# Patient Record
Sex: FEMALE | Race: BLACK OR AFRICAN AMERICAN | NOT HISPANIC OR LATINO | Employment: FULL TIME | URBAN - METROPOLITAN AREA
[De-identification: names, ages, dates, MRNs, and addresses within clinical notes are randomized per-mention and may not be internally consistent; named-entity substitution may affect disease eponyms.]

---

## 2022-08-16 ENCOUNTER — TELEPHONE (OUTPATIENT)
Dept: ONCOLOGY | Facility: CLINIC | Age: 32
End: 2022-08-16

## 2022-08-16 NOTE — TELEPHONE ENCOUNTER
[facility]UB     Left voicemail for patient to call back and give HUB information that is missing. Please send me an in basket when you have entered in her info. Thank you

## 2022-08-19 DIAGNOSIS — D75.839 THROMBOCYTOSIS: Primary | ICD-10-CM

## 2022-08-22 ENCOUNTER — CONSULT (OUTPATIENT)
Dept: ONCOLOGY | Facility: CLINIC | Age: 32
End: 2022-08-22

## 2022-08-22 ENCOUNTER — LAB (OUTPATIENT)
Dept: LAB | Facility: HOSPITAL | Age: 32
End: 2022-08-22

## 2022-08-22 VITALS
RESPIRATION RATE: 18 BRPM | OXYGEN SATURATION: 100 % | TEMPERATURE: 96.9 F | HEIGHT: 65 IN | HEART RATE: 108 BPM | BODY MASS INDEX: 43.28 KG/M2 | WEIGHT: 259.8 LBS | SYSTOLIC BLOOD PRESSURE: 127 MMHG | DIASTOLIC BLOOD PRESSURE: 85 MMHG

## 2022-08-22 DIAGNOSIS — D75.839 THROMBOCYTOSIS: ICD-10-CM

## 2022-08-22 DIAGNOSIS — E61.1 IRON DEFICIENCY: Primary | ICD-10-CM

## 2022-08-22 LAB
ALBUMIN SERPL-MCNC: 4.2 G/DL (ref 3.5–5.2)
ALBUMIN/GLOB SERPL: 1.1 G/DL (ref 1.1–2.4)
ALP SERPL-CCNC: 65 U/L (ref 38–116)
ALT SERPL W P-5'-P-CCNC: 125 U/L (ref 0–33)
ANION GAP SERPL CALCULATED.3IONS-SCNC: 14.9 MMOL/L (ref 5–15)
AST SERPL-CCNC: 50 U/L (ref 0–32)
BASOPHILS # BLD AUTO: 0.05 10*3/MM3 (ref 0–0.2)
BASOPHILS NFR BLD AUTO: 0.7 % (ref 0–1.5)
BILIRUB SERPL-MCNC: 0.2 MG/DL (ref 0.2–1.2)
BUN SERPL-MCNC: 7 MG/DL (ref 6–20)
BUN/CREAT SERPL: 8.3 (ref 7.3–30)
CALCIUM SPEC-SCNC: 9.4 MG/DL (ref 8.5–10.2)
CHLORIDE SERPL-SCNC: 100 MMOL/L (ref 98–107)
CO2 SERPL-SCNC: 23.1 MMOL/L (ref 22–29)
CREAT SERPL-MCNC: 0.84 MG/DL (ref 0.6–1.1)
DEPRECATED RDW RBC AUTO: 39.8 FL (ref 37–54)
EGFRCR SERPLBLD CKD-EPI 2021: 95.4 ML/MIN/1.73
EOSINOPHIL # BLD AUTO: 0.07 10*3/MM3 (ref 0–0.4)
EOSINOPHIL NFR BLD AUTO: 0.9 % (ref 0.3–6.2)
ERYTHROCYTE [DISTWIDTH] IN BLOOD BY AUTOMATED COUNT: 13.3 % (ref 12.3–15.4)
FERRITIN SERPL-MCNC: 64.8 NG/ML (ref 11–207)
GLOBULIN UR ELPH-MCNC: 3.7 GM/DL (ref 1.8–3.5)
GLUCOSE SERPL-MCNC: 101 MG/DL (ref 74–124)
HCT VFR BLD AUTO: 40.3 % (ref 34–46.6)
HGB BLD-MCNC: 13.5 G/DL (ref 12–15.9)
IMM GRANULOCYTES # BLD AUTO: 0.05 10*3/MM3 (ref 0–0.05)
IMM GRANULOCYTES NFR BLD AUTO: 0.7 % (ref 0–0.5)
IRON 24H UR-MRATE: 48 MCG/DL (ref 37–145)
IRON SATN MFR SERPL: 10 % (ref 14–48)
LYMPHOCYTES # BLD AUTO: 3.42 10*3/MM3 (ref 0.7–3.1)
LYMPHOCYTES NFR BLD AUTO: 44.6 % (ref 19.6–45.3)
MCH RBC QN AUTO: 27.5 PG (ref 26.6–33)
MCHC RBC AUTO-ENTMCNC: 33.5 G/DL (ref 31.5–35.7)
MCV RBC AUTO: 82.1 FL (ref 79–97)
MONOCYTES # BLD AUTO: 0.51 10*3/MM3 (ref 0.1–0.9)
MONOCYTES NFR BLD AUTO: 6.7 % (ref 5–12)
NEUTROPHILS NFR BLD AUTO: 3.56 10*3/MM3 (ref 1.7–7)
NEUTROPHILS NFR BLD AUTO: 46.4 % (ref 42.7–76)
NRBC BLD AUTO-RTO: 0 /100 WBC (ref 0–0.2)
PLATELET # BLD AUTO: 441 10*3/MM3 (ref 140–450)
PMV BLD AUTO: 9.1 FL (ref 6–12)
POTASSIUM SERPL-SCNC: 3.5 MMOL/L (ref 3.5–4.7)
PROT SERPL-MCNC: 7.9 G/DL (ref 6.3–8)
RBC # BLD AUTO: 4.91 10*6/MM3 (ref 3.77–5.28)
SODIUM SERPL-SCNC: 138 MMOL/L (ref 134–145)
TIBC SERPL-MCNC: 463 MCG/DL (ref 249–505)
TRANSFERRIN SERPL-MCNC: 331 MG/DL (ref 200–360)
VIT B12 BLD-MCNC: 565 PG/ML (ref 211–946)
WBC NRBC COR # BLD: 7.66 10*3/MM3 (ref 3.4–10.8)

## 2022-08-22 PROCEDURE — 82728 ASSAY OF FERRITIN: CPT | Performed by: INTERNAL MEDICINE

## 2022-08-22 PROCEDURE — 85025 COMPLETE CBC W/AUTO DIFF WBC: CPT

## 2022-08-22 PROCEDURE — 80053 COMPREHEN METABOLIC PANEL: CPT | Performed by: INTERNAL MEDICINE

## 2022-08-22 PROCEDURE — 99204 OFFICE O/P NEW MOD 45 MIN: CPT | Performed by: INTERNAL MEDICINE

## 2022-08-22 PROCEDURE — 36415 COLL VENOUS BLD VENIPUNCTURE: CPT

## 2022-08-22 PROCEDURE — 83540 ASSAY OF IRON: CPT | Performed by: INTERNAL MEDICINE

## 2022-08-22 PROCEDURE — 82607 VITAMIN B-12: CPT | Performed by: INTERNAL MEDICINE

## 2022-08-22 PROCEDURE — 84466 ASSAY OF TRANSFERRIN: CPT | Performed by: INTERNAL MEDICINE

## 2022-08-22 RX ORDER — PROMETHAZINE HYDROCHLORIDE 25 MG/1
TABLET ORAL
COMMUNITY
Start: 2022-08-05

## 2022-08-22 RX ORDER — NORETHINDRONE AND ETHINYL ESTRADIOL 7 DAYS X 3
1 KIT ORAL DAILY
COMMUNITY
Start: 2022-06-16 | End: 2022-11-21

## 2022-08-22 RX ORDER — CYCLOBENZAPRINE HCL 10 MG
TABLET ORAL
COMMUNITY
Start: 2022-07-27

## 2022-08-22 RX ORDER — MONTELUKAST SODIUM 10 MG/1
10 TABLET ORAL DAILY
COMMUNITY
Start: 2022-08-16

## 2022-08-22 RX ORDER — PHENTERMINE HYDROCHLORIDE 37.5 MG/1
TABLET ORAL
COMMUNITY
Start: 2022-08-19

## 2022-08-22 RX ORDER — METRONIDAZOLE 10 MG/G
GEL TOPICAL
COMMUNITY
Start: 2022-07-27

## 2022-08-22 RX ORDER — LORATADINE 10 MG/1
10 TABLET ORAL DAILY
COMMUNITY
Start: 2022-07-19

## 2022-08-22 RX ORDER — HYDROCHLOROTHIAZIDE 12.5 MG/1
TABLET ORAL
COMMUNITY
Start: 2022-08-16

## 2022-08-22 NOTE — PROGRESS NOTES
.     REASON FOR CONSULTATION:     Provide an opinion on any further workup or treatment of thrombocytosis.                             REQUESTING PHYSICIAN: LARA Mckenna     RECORDS OBTAINED:  Records of the patient's history including those obtained from the referring provider were reviewed and summarized in detail.    HISTORY OF PRESENT ILLNESS:  The patient is a 31 y.o. year old female with seasonal allergy, hypertension and intermittent mild thrombocytosis who presented today for initial evaluation because of thrombocytosis.     Her most recent study from outside lab reported elevation of platelets 590,000 on 08/09/2022, in the meantime she has normal hemoglobin 13.4, WBC 6800. She also tested negative for hepatitis A IgM, hepatitis B surface antigen, hepatitis B core antibody IgM, and also negative for hepatitis C antibody. Her chemistry lab reported elevated , AST 71, and total bilirubin less than 0.2, normal alkaline phosphatase 69 and total serum protein 6.9, albumin 3.8.     The patient reports she was recently ill about a week ago, had nausea and vomiting but otherwise no significant medical problem. She reports no fever, sweating or chills. She already recovered to baseline condition.  She does not drink alcohol.    I reviewed her other laboratory study results on 07/20/2022, she had normal folate 12.7 ng/mL, and there was no vitamin B12 level. Chemistry lab reported normal renal function creatinine 0.72, normal electrolytes, total protein 7.7, albumin 4.2, globulin 3.5, and mildly elevated AST 61, , but normal total bilirubin and alkaline phosphatase.     I reviewed previous lab results.  The oldest lab results available were from 10/10/2017 which reported elevated platelets 459,000 with reference range 150,000-379,000. She had total WBC 4000 including neutrophils 1700, lymphocytes 1900 monocytes 300, and hemoglobin 13.2, MCV 84.0. The next results available were from  10/12/2018. She had platelets 413,000 (with reference 150,000-379,000), WBC 5300, including neutrophils 1700, lymphocytes 3000, and hemoglobin 13.6, with MCV 86, MCHC 32.9. No chemistry lab.     The next lab results were from 11/01/2019 and she had normal TSH 2.27, platelets 453,000 with reference range 150,000-450,000, normal hemoglobin 13.3, MCV 85, MCHC 33.5. WBC was 4400, including neutrophils 2100, lymphocytes 1900. Chemistry lab reported unremarkable CMP with normal renal function, liver function panel, electrolytes, total protein 7.0, albumin 4.2, globulin 2.8.     The next lab was from 10/15/2021 which reported platelets 536,000 with reference range 150,000-450,000, hemoglobin 12.6, MCV 82, MCHC 33.0, and WBC 6300, including neutrophils 2900, lymphocytes 3000 and monocytes 400.     The next set of lab results were from 12/07/2021 with platelets 493,000, reference range 150,000-450,000, hemoglobin 13.0, MCV 83.0, MCHC 32.7 and WBC 5900 including neutrophils 3000, lymphocytes 2400.     Labs from 01/21/2022 reported normal homocysteine level 8.9, normal methylmalonic acid 226 nmol/L, normal CMP including creatinine 0.57, normal electrolytes, glucose and liver function panel except marginally elevated ALT 38 otherwise unremarkable liver panel. She had iron saturation 15%, free iron 57, TIBC 386 and vitamin B12 level 234 pg/mL, C-reactive protein 27 mg/L without ferritin level.     Then study on 07/08/2022 reported ferritin 62 ng/mL but without iron profile, normal TSH 3.12, platelets 306,000 with normal range 150,000-450,000, and WBC 6600, neutrophils 3200, lymphocytes 3000, and hemoglobin 13.8, MCV 83.0, MCHC 32.5. Chemistry lab reported unremarkable CMP except mildly elevated AST 49, and worsening ALT 74 but normal alkaline phosphatase and total bilirubin.       Laboratory study today in our clinic on 08/22/2022 reported improved platelets 441,000 with reference range 150,000-450,000, normal WBC 7660 including  neutrophils 3560, lymphocytes 3420, monocytes 510 and hemoglobin 13.5 with MCV 82.1, MCHC 33.5.     I reviewed her peripheral blood smear today, there is mild anisocytosis, however no target cells, no schistocytes.    The patient reports she had heavy menses since 10/2021. Prior to that she had regular menstrual volume.     The patient reports she is not on oral iron therapy.    The patient does report chronic fatigue but denies fainting, syncope. She denies melena or hematochezia. She has some night sweats, no cold sensitivity or heat intolerance. She has soreness on her tongue surface but otherwise denies mucositis. She has lower Gi symptoms with poor appetite, intermittent abdominal pain, nausea, constipation and diarrhea intermittently, however she denies melena or hematochezia or fresh red blood per rectum. She also has chronic back pain and arthralgia with some numbness in extremities. She also has intermittent headaches and dizziness but no fainting. She also reports easy bruising but no spontaneous bleeding such as epistaxis or gum bleeding.         Past Medical History:   Diagnosis Date    History of thrombocytosis     Mild     No past surgical history on file.    MEDICATIONS    Current Outpatient Medications:     ciclopirox (PENLAC) 8 % solution, APPLY EXTERNALLY TO APPROPRIATE AREA(S) ONCE A DAY FOR 30 DAYS, Disp: , Rfl:     cyclobenzaprine (FLEXERIL) 10 MG tablet, TAKE 1 TABLET BY MOUTH THREE TIMES A DAY AS NEEDED FOR 10 DAYS, Disp: , Rfl:     Dasetta 7/7/7 0.5/0.75/1-35 MG-MCG per tablet, Take 1 tablet by mouth Daily., Disp: , Rfl:     hydroCHLOROthiazide (HYDRODIURIL) 12.5 MG tablet, TAKE 1 OR 2 TABLETS BY MOUTH IN THE MORNING, Disp: , Rfl:     loratadine (CLARITIN) 10 MG tablet, Take 10 mg by mouth Daily., Disp: , Rfl:     metroNIDAZOLE (METROGEL) 1 % gel, APPLY TOPICALLY TO AFFECTED AREA(S) TWO TIMES DAILY FOR 30 DAYS, Disp: , Rfl:     montelukast (SINGULAIR) 10 MG tablet, Take 10 mg by mouth Daily.,  Disp: , Rfl:     phentermine (ADIPEX-P) 37.5 MG tablet, , Disp: , Rfl:     promethazine (PHENERGAN) 25 MG tablet, TAKE 1 TABLET BY MOUTH EVERY EIGHT HOURS AS NEEDED FOR NAUSEA FOR 5 DAYS, Disp: , Rfl:     ALLERGIES:     Allergies   Allergen Reactions    Clindamycin/Lincomycin Rash    Thimerosal Rash and Swelling    Bacitracin Rash    Blue Dyes (Parenteral) Rash    Neomycin Rash       SOCIAL HISTORY:       Social History     Socioeconomic History    Marital status: Single   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   No history of cigarette smoking.  Very occasional social drinker.        FAMILY HISTORY:  No family history for blood disorder including sickle cell disorder.   Her mother has IBS.   Maternal great grandmother had malignancy no details, she also had diabetes.  She passed away.     .    REVIEW OF SYSTEMS:  Review of Systems   Constitutional: Positive for diaphoresis (Intermittent night sweats) and fatigue. Negative for appetite change and unexpected weight change.   HENT: Positive for mouth sores (Soreness on tongue surface, no oral mucositis). Negative for nosebleeds and sore throat.    Eyes: Negative for visual disturbance.   Respiratory: Negative for cough and shortness of breath.    Cardiovascular: Negative for chest pain and leg swelling.   Gastrointestinal: Positive for abdominal pain, constipation, diarrhea, nausea and vomiting. Negative for anal bleeding and blood in stool.   Endocrine: Negative for cold intolerance and heat intolerance.   Genitourinary: Positive for vaginal bleeding (Heavy menses since October 2021). Negative for hematuria.   Musculoskeletal: Positive for joint swelling (Ankle swelling). Negative for arthralgias.   Skin: Positive for rash. Negative for pallor.   Allergic/Immunologic: Negative for immunocompromised state.   Neurological: Positive for dizziness, light-headedness, numbness and headaches. Negative for syncope.   Hematological: Negative for  "adenopathy. Bruises/bleeds easily.   Psychiatric/Behavioral: Negative for confusion.              Vitals:    08/22/22 0913   BP: 127/85   Pulse: 108   Resp: 18   Temp: 96.9 °F (36.1 °C)   TempSrc: Temporal   SpO2: 100%   Weight: 118 kg (259 lb 12.8 oz)   Height: 165.1 cm (65\")   PainSc:   4   PainLoc: Abdomen     No flowsheet data found.   PHYSICAL EXAM:      CONSTITUTIONAL:  Vital signs reviewed.  Well-developed -American female BMI 43.2.  No distress, looks comfortable.  EYES:  Conjunctivae and lids unremarkable.  PERRLA  EARS,NOSE,MOUTH,THROAT:  Ears and nose appear unremarkable.  Lips, teeth, gums appear unremarkable.  RESPIRATORY:  Normal respiratory effort.  Lungs clear to auscultation bilaterally.  CARDIOVASCULAR:  Normal S1, S2.  No murmurs rubs or gallops.  No significant lower extremity edema.  GASTROINTESTINAL: Abdomen Nontender.  No hepatomegaly.  No splenomegaly.  Bowel sounds normal.  LYMPHATIC:  No cervical, supraclavicular, axillary lymphadenopathy.  MUSCULOSKELETAL:  Unremarkable gait and station.  Unremarkable digits/nails.  No cyanosis or clubbing.  SKIN:  Warm.  No rashes.  PSYCHIATRIC:  Normal judgment and insight.  Normal mood and affect.      RECENT LABS:        WBC   Date Value Ref Range Status   08/22/2022 7.66 3.40 - 10.80 10*3/mm3 Final     Hemoglobin   Date Value Ref Range Status   08/22/2022 13.5 12.0 - 15.9 g/dL Final     Platelets   Date Value Ref Range Status   08/22/2022 441 140 - 450 10*3/mm3 Final     Comment:     New pt fingerstick tube-some plt clumping       Assessment & Plan       ASSESSMENT:  1. Mild thrombocytopenia, intermittent since 2017. She only had 1 time platelets 603,000 but the rest all below 600,000 and sometimes in the lower 400,000 range. I reviewed her peripheral blood smear today, no evidence for hematological malignancy. I think her thrombocytosis is most likely reactive to some inflammatory condition such as recently she had worsening GI symptoms sounds " like viral infection. She had elevation of platelets.  However today platelets are within normal territory at 441,000.  Over the past several years, there was no persistent worsening thrombocytosis, instead it was fluctuation over a time.  This does not fit with myeloproliferative disorder.  I do not think we need to do JAK2 mutation at this point.    2. Evidence of iron deficiency with low iron saturation in 01/2022. The patient also reports she started having heavy menses since 10/2021. Laboratory study today reported normal hemoglobin, however she has microcytosis and morphology of peripheral blood smear also suspicious for some iron deficiency. I recommended to obtain iron study. I pointed out that iron deficiency itself could also sometimes cause elevation of platelets.     3.  The patient has elevated liver function panel. This is not clear for the etiology. She reports had recent ultrasound of the liver and was reported normal. I do not have that report available. The patient will continue follow up with her primary care provider.        PLAN:  1. Laboratory study today.  - Ferritin  - Iron Profile  - Vitamin B12  - Comprehensive Metabolic Panel    2. Will call results to patient.  3. I will bring the patient back for reevaluation in 3 months, will repeat CBC, ferritin, iron profile and also CMP.        ADDENDUM:     Lab Results   Component Value Date    IRON 48 08/22/2022    TIBC 463 08/22/2022    FERRITIN 64.80 08/22/2022   Iron saturation 10%.    Lab Results   Component Value Date    [id number] 565 08/22/2022     Lab Results   Component Value Date    GLUCOSE 101 08/22/2022    BUN 7 08/22/2022    CREATININE 0.84 08/22/2022    BCR 8.3 08/22/2022    K 3.5 08/22/2022    CO2 23.1 08/22/2022    CALCIUM 9.4 08/22/2022    ALBUMIN 4.20 08/22/2022    AST 50 (H) 08/22/2022     (C) 08/22/2022         Laboratory study today reported improved  and AST 50, normal total bilirubin and alkaline phosphatase.  She also has worsening iron saturation 10% with free iron 48, TIBC 463, and mediocre ferritin 64.8 ng/mL, normal but mediocre B12 level at 565 pg/mL.     I think this patient would benefit from oral iron treatment with ferrous sulfate 325 mg twice a day. We will prescribe to the patient and inform her.        BOBBY MOORE M.D., Ph.D.     8/22/2022          CC: LARA Mckenna

## 2022-09-01 ENCOUNTER — TELEPHONE (OUTPATIENT)
Dept: ONCOLOGY | Facility: CLINIC | Age: 32
End: 2022-09-01

## 2022-09-01 RX ORDER — FERROUS SULFATE 325(65) MG
325 TABLET ORAL 2 TIMES DAILY WITH MEALS
Qty: 60 TABLET | Refills: 3 | Status: SHIPPED | OUTPATIENT
Start: 2022-09-01

## 2022-09-01 NOTE — TELEPHONE ENCOUNTER
Caller: KENZIE    Relationship to patient: SELF    Best call back number: [phone]    Patient is needing: TO KNOW WHAT IS GOING ON WITH HER TREATMENT PLAN. SHE SAID SHE HAS NOT HEARD ANYTHING.

## 2022-09-01 NOTE — TELEPHONE ENCOUNTER
Returned call to patient after reviewing Dr. Pantoja's note from 8/22/2022, Patient is to take Ferrous Sulfate 325 mg bid, which will be sent to her pharmacy.  He will see her back in 3 months and lab test will be repeated at that time.  She v/u.

## 2022-11-21 ENCOUNTER — OFFICE VISIT (OUTPATIENT)
Dept: ONCOLOGY | Facility: CLINIC | Age: 32
End: 2022-11-21

## 2022-11-21 ENCOUNTER — LAB (OUTPATIENT)
Dept: LAB | Facility: HOSPITAL | Age: 32
End: 2022-11-21

## 2022-11-21 VITALS
HEART RATE: 114 BPM | SYSTOLIC BLOOD PRESSURE: 125 MMHG | BODY MASS INDEX: 42.85 KG/M2 | RESPIRATION RATE: 18 BRPM | DIASTOLIC BLOOD PRESSURE: 83 MMHG | HEIGHT: 65 IN | WEIGHT: 257.2 LBS | TEMPERATURE: 98 F

## 2022-11-21 DIAGNOSIS — D75.839 THROMBOCYTOSIS: ICD-10-CM

## 2022-11-21 DIAGNOSIS — E61.1 IRON DEFICIENCY: ICD-10-CM

## 2022-11-21 DIAGNOSIS — E61.1 IRON DEFICIENCY: Primary | ICD-10-CM

## 2022-11-21 LAB
ALBUMIN SERPL-MCNC: 4.1 G/DL (ref 3.5–5.2)
ALBUMIN/GLOB SERPL: 1.1 G/DL (ref 1.1–2.4)
ALP SERPL-CCNC: 61 U/L (ref 38–116)
ALT SERPL W P-5'-P-CCNC: 14 U/L (ref 0–33)
ANION GAP SERPL CALCULATED.3IONS-SCNC: 13 MMOL/L (ref 5–15)
AST SERPL-CCNC: 18 U/L (ref 0–32)
BASOPHILS # BLD AUTO: 0.05 10*3/MM3 (ref 0–0.2)
BASOPHILS NFR BLD AUTO: 0.8 % (ref 0–1.5)
BILIRUB SERPL-MCNC: 0.3 MG/DL (ref 0.2–1.2)
BUN SERPL-MCNC: 9 MG/DL (ref 6–20)
BUN/CREAT SERPL: 12.7 (ref 7.3–30)
CALCIUM SPEC-SCNC: 9.6 MG/DL (ref 8.5–10.2)
CHLORIDE SERPL-SCNC: 98 MMOL/L (ref 98–107)
CO2 SERPL-SCNC: 27 MMOL/L (ref 22–29)
CREAT SERPL-MCNC: 0.71 MG/DL (ref 0.6–1.1)
DEPRECATED RDW RBC AUTO: 42.8 FL (ref 37–54)
EGFRCR SERPLBLD CKD-EPI 2021: 116.7 ML/MIN/1.73
EOSINOPHIL # BLD AUTO: 0.04 10*3/MM3 (ref 0–0.4)
EOSINOPHIL NFR BLD AUTO: 0.6 % (ref 0.3–6.2)
ERYTHROCYTE [DISTWIDTH] IN BLOOD BY AUTOMATED COUNT: 13.6 % (ref 12.3–15.4)
FERRITIN SERPL-MCNC: 91.3 NG/ML (ref 11–207)
GLOBULIN UR ELPH-MCNC: 3.9 GM/DL (ref 1.8–3.5)
GLUCOSE SERPL-MCNC: 105 MG/DL (ref 74–124)
HCT VFR BLD AUTO: 43.2 % (ref 34–46.6)
HGB BLD-MCNC: 13.7 G/DL (ref 12–15.9)
IMM GRANULOCYTES # BLD AUTO: 0.01 10*3/MM3 (ref 0–0.05)
IMM GRANULOCYTES NFR BLD AUTO: 0.2 % (ref 0–0.5)
IRON 24H UR-MRATE: 53 MCG/DL (ref 37–145)
IRON SATN MFR SERPL: 14 % (ref 14–48)
LYMPHOCYTES # BLD AUTO: 3.43 10*3/MM3 (ref 0.7–3.1)
LYMPHOCYTES NFR BLD AUTO: 52.4 % (ref 19.6–45.3)
MCH RBC QN AUTO: 27.3 PG (ref 26.6–33)
MCHC RBC AUTO-ENTMCNC: 31.7 G/DL (ref 31.5–35.7)
MCV RBC AUTO: 86.1 FL (ref 79–97)
MONOCYTES # BLD AUTO: 0.41 10*3/MM3 (ref 0.1–0.9)
MONOCYTES NFR BLD AUTO: 6.3 % (ref 5–12)
NEUTROPHILS NFR BLD AUTO: 2.6 10*3/MM3 (ref 1.7–7)
NEUTROPHILS NFR BLD AUTO: 39.7 % (ref 42.7–76)
NRBC BLD AUTO-RTO: 0 /100 WBC (ref 0–0.2)
PLATELET # BLD AUTO: 472 10*3/MM3 (ref 140–450)
PMV BLD AUTO: 7.9 FL (ref 6–12)
POTASSIUM SERPL-SCNC: 3.4 MMOL/L (ref 3.5–4.7)
PROT SERPL-MCNC: 8 G/DL (ref 6.3–8)
RBC # BLD AUTO: 5.02 10*6/MM3 (ref 3.77–5.28)
SODIUM SERPL-SCNC: 138 MMOL/L (ref 134–145)
TIBC SERPL-MCNC: 389 MCG/DL (ref 249–505)
TRANSFERRIN SERPL-MCNC: 278 MG/DL (ref 200–360)
WBC NRBC COR # BLD: 6.54 10*3/MM3 (ref 3.4–10.8)

## 2022-11-21 PROCEDURE — 82728 ASSAY OF FERRITIN: CPT

## 2022-11-21 PROCEDURE — 83540 ASSAY OF IRON: CPT

## 2022-11-21 PROCEDURE — 80053 COMPREHEN METABOLIC PANEL: CPT

## 2022-11-21 PROCEDURE — 36415 COLL VENOUS BLD VENIPUNCTURE: CPT

## 2022-11-21 PROCEDURE — 84466 ASSAY OF TRANSFERRIN: CPT

## 2022-11-21 PROCEDURE — 85025 COMPLETE CBC W/AUTO DIFF WBC: CPT

## 2022-11-21 PROCEDURE — 99213 OFFICE O/P EST LOW 20 MIN: CPT | Performed by: INTERNAL MEDICINE

## 2022-11-21 RX ORDER — FEXOFENADINE HCL 180 MG/1
TABLET ORAL
COMMUNITY
Start: 2022-11-13

## 2022-11-21 RX ORDER — ACETAMINOPHEN 500 MG
2000 TABLET ORAL DAILY
COMMUNITY
Start: 2022-10-31

## 2022-11-21 RX ORDER — MISOPROSTOL 200 UG/1
TABLET ORAL
COMMUNITY
Start: 2022-10-07

## 2022-11-21 NOTE — PROGRESS NOTES
.     REASON FOR FOLLOWUP:     1. Mild thrombocytosis  2. Iron-deficiency anemia                                 HISTORY OF PRESENT ILLNESS:  The patient is a 31 y.o. year old female with seasonal allergy, hypertension and intermittent mild thrombocytosis who presented today on 11/21/2022 for 3-month reevaluation.     Patient admits to taking her ferrous sulfate twice a day. She complains of having on and off rashes since she was last seen. Patient showed me multiple photos of sporadic rashes on her face, chest, hands, and lower extremity. The rashes on face and the hands looks like wheels but sometimes the rashes improve on their own. She is waiting to see an allergist but she has not scheduled an appointment yet.    She is unsure if these are side effects to the ferrous sulfate. She reports having no known allergies to any other medications. Patient had an IUD placed in the past since she was last seen. She reports having lesser volume of blood with her menstruation.    Laboratory study today on 11/21/2022 reported mild thrombocytosis platelets 472,000, normal hemoglobin 13.7, improved MCV 86.1, and WBC 6540 including neutrophils 2600, lymphocytes 3430.  Iron study reported improved ferritin 91.3 ng/mL, and iron saturation 14% with a free iron 53 TIBC 389.  Chemistry lab reported normalized liver function panel and unremarkable CMP.      Past Medical History:   Diagnosis Date   • Asthma    • History of thrombocytosis     Mild   • Tattoos      No past surgical history on file.    HEMATOLOGY HISTORY:  The patient is a 31 y.o. year old female with seasonal allergy, hypertension and intermittent mild thrombocytosis who presented on 8/22/2022 for initial evaluation because of thrombocytosis.     Her most recent study from outside lab reported elevation of platelets 590,000 on 08/09/2022, in the meantime she has normal hemoglobin 13.4, WBC 6800. She also tested negative for hepatitis A IgM, hepatitis B surface  antigen, hepatitis B core antibody IgM, and also negative for hepatitis C antibody. Her chemistry lab reported elevated , AST 71, and total bilirubin less than 0.2, normal alkaline phosphatase 69 and total serum protein 6.9, albumin 3.8.     The patient reports she was recently ill about a week ago, had nausea and vomiting but otherwise no significant medical problem. She reports no fever, sweating or chills. She already recovered to baseline condition.  She does not drink alcohol.    I reviewed her other laboratory study results on 07/20/2022, she had normal folate 12.7 ng/mL, and there was no vitamin B12 level. Chemistry lab reported normal renal function creatinine 0.72, normal electrolytes, total protein 7.7, albumin 4.2, globulin 3.5, and mildly elevated AST 61, , but normal total bilirubin and alkaline phosphatase.     I reviewed previous lab results.  The oldest lab results available were from 10/10/2017 which reported elevated platelets 459,000 with reference range 150,000-379,000. She had total WBC 4000 including neutrophils 1700, lymphocytes 1900 monocytes 300, and hemoglobin 13.2, MCV 84.0. The next results available were from 10/12/2018. She had platelets 413,000 (with reference 150,000-379,000), WBC 5300, including neutrophils 1700, lymphocytes 3000, and hemoglobin 13.6, with MCV 86, MCHC 32.9. No chemistry lab.     The next lab results were from 11/01/2019 and she had normal TSH 2.27, platelets 453,000 with reference range 150,000-450,000, normal hemoglobin 13.3, MCV 85, MCHC 33.5. WBC was 4400, including neutrophils 2100, lymphocytes 1900. Chemistry lab reported unremarkable CMP with normal renal function, liver function panel, electrolytes, total protein 7.0, albumin 4.2, globulin 2.8.     The next lab was from 10/15/2021 which reported platelets 536,000 with reference range 150,000-450,000, hemoglobin 12.6, MCV 82, MCHC 33.0, and WBC 6300, including neutrophils 2900, lymphocytes 3000  and monocytes 400.     The next set of lab results were from 12/07/2021 with platelets 493,000, reference range 150,000-450,000, hemoglobin 13.0, MCV 83.0, MCHC 32.7 and WBC 5900 including neutrophils 3000, lymphocytes 2400.     Labs from 01/21/2022 reported normal homocysteine level 8.9, normal methylmalonic acid 226 nmol/L, normal CMP including creatinine 0.57, normal electrolytes, glucose and liver function panel except marginally elevated ALT 38 otherwise unremarkable liver panel. She had iron saturation 15%, free iron 57, TIBC 386 and vitamin B12 level 234 pg/mL, C-reactive protein 27 mg/L without ferritin level.     Then study on 07/08/2022 reported ferritin 62 ng/mL but without iron profile, normal TSH 3.12, platelets 306,000 with normal range 150,000-450,000, and WBC 6600, neutrophils 3200, lymphocytes 3000, and hemoglobin 13.8, MCV 83.0, MCHC 32.5. Chemistry lab reported unremarkable CMP except mildly elevated AST 49, and worsening ALT 74 but normal alkaline phosphatase and total bilirubin.     The patient reports she had heavy menses since 10/2021. Prior to that she had regular menstrual volume.     The patient reports she is not on oral iron therapy.    The patient does report chronic fatigue but denies fainting, syncope. She denies melena or hematochezia. She has some night sweats, no cold sensitivity or heat intolerance. She has soreness on her tongue surface but otherwise denies mucositis. She has lower Gi symptoms with poor appetite, intermittent abdominal pain, nausea, constipation and diarrhea intermittently, however she denies melena or hematochezia or fresh red blood per rectum. She also has chronic back pain and arthralgia with some numbness in extremities. She also has intermittent headaches and dizziness but no fainting. She also reports easy bruising but no spontaneous bleeding such as epistaxis or gum bleeding.     Laboratory study today in our clinic on 08/22/2022 reported improved platelets  441,000 with reference range 150,000-450,000, normal WBC 7660 including neutrophils 3560, lymphocytes 3420, monocytes 510 and hemoglobin 13.5 with MCV 82.1, MCHC 33.5.     I reviewed her peripheral blood smear today, there is mild anisocytosis, however no target cells, no schistocytes.    Laboratory study 8/22/2022 reported improved  and AST 50, normal total bilirubin and alkaline phosphatase. She also has worsening iron saturation 10% with free iron 48, TIBC 463, and mediocre ferritin 64.8 ng/mL, normal but mediocre B12 level at 565 pg/mL.     I think this patient would benefit from oral iron treatment with ferrous sulfate 325 mg twice a day. We will prescribe to the patient and inform her.    Laboratory results from 11/21/2022 reports hemoglobin 13.7 g/dL, WBC 6,540/mm3, platelets 472,000/mm3. Today, her iron is 53 mcg/dL, ferritin 91.30 ng/mL, iron saturation 14%, and TIBC 389 mcg/dL. Iron profile and ferritin results were pending during the visit.    Laboratory study in our clinic on 08/22/2022 reported improved platelets 441,000 with reference range 150,000-450,000, normal WBC 7660 including neutrophils 3560, lymphocytes 3420, monocytes 510 and hemoglobin 13.5 with MCV 82.1, MCHC 33.5.     Laboratory study on 8/22/2022 reported improved  and AST 50, normal total bilirubin and alkaline phosphatase. She also has worsening iron saturation 10% with free iron 48, TIBC 463, and mediocre ferritin 64.8 ng/mL, normal but mediocre B12 level at 565 pg/mL.     I think this patient would benefit from oral iron treatment with ferrous sulfate 325 mg twice a day. We will prescribe to the patient and inform her.      MEDICATIONS    Current Outpatient Medications:   •  ciclopirox (PENLAC) 8 % solution, APPLY EXTERNALLY TO APPROPRIATE AREA(S) ONCE A DAY FOR 30 DAYS, Disp: , Rfl:   •  cyclobenzaprine (FLEXERIL) 10 MG tablet, TAKE 1 TABLET BY MOUTH THREE TIMES A DAY AS NEEDED FOR 10 DAYS, Disp: , Rfl:   •  ferrous  sulfate 325 (65 FE) MG tablet, Take 1 tablet by mouth 2 (Two) Times a Day With Meals., Disp: 60 tablet, Rfl: 3  •  fexofenadine (ALLEGRA) 180 MG tablet, , Disp: , Rfl:   •  hydroCHLOROthiazide (HYDRODIURIL) 12.5 MG tablet, TAKE 1 OR 2 TABLETS BY MOUTH IN THE MORNING, Disp: , Rfl:   •  metroNIDAZOLE (METROGEL) 1 % gel, APPLY TOPICALLY TO AFFECTED AREA(S) TWO TIMES DAILY FOR 30 DAYS, Disp: , Rfl:   •  montelukast (SINGULAIR) 10 MG tablet, Take 10 mg by mouth Daily., Disp: , Rfl:   •  phentermine (ADIPEX-P) 37.5 MG tablet, , Disp: , Rfl:   •  SM Vitamin D3 50 MCG capsule, Take 2,000 Units by mouth Daily., Disp: , Rfl:   •  loratadine (CLARITIN) 10 MG tablet, Take 10 mg by mouth Daily., Disp: , Rfl:   •  miSOPROStol (CYTOTEC) 200 MCG tablet, TAKE 1 TABLET BY MOUTH TWO TIMES A DAY. 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   · No history of cigarette smoking.  Very occasional social drinker.        FAMILY HISTORY:  · No family history for blood disorder including sickle cell disorder.   · Her mother has IBS.   · Maternal great grandmother had malignancy no details, she also had diabetes.  She passed away.     .    REVIEW OF SYSTEMS:  Review of Systems   Constitutional: Positive for diaphoresis (Intermittent night sweats) and fatigue (Somewhat improved fatigue after starting iron). Negative for appetite change and unexpected weight change.   HENT: Positive for mouth sores  "(Soreness on tongue surface, no oral mucositis). Negative for nosebleeds and sore throat.    Eyes: Negative for visual disturbance.   Respiratory: Negative for cough and shortness of breath.    Cardiovascular: Negative for chest pain and leg swelling.   Gastrointestinal: Positive for abdominal pain, constipation, diarrhea, nausea and vomiting. Negative for anal bleeding and blood in stool.   Endocrine: Negative for cold intolerance and heat intolerance.   Genitourinary: Positive for vaginal bleeding (Heavy menses since October 2021). Negative for hematuria.   Musculoskeletal: Positive for joint swelling (Ankle swelling). Negative for arthralgias.   Skin: Positive for rash. Negative for pallor.   Allergic/Immunologic: Negative for immunocompromised state.   Neurological: Positive for light-headedness, numbness and headaches. Negative for dizziness and syncope.   Hematological: Negative for adenopathy. Bruises/bleeds easily.   Psychiatric/Behavioral: Negative for confusion.     11/21/2022, no ongoing rash on the legs at this point; benign.         Vitals:    11/21/22 1317   BP: 125/83   Pulse: 114   Resp: 18   Temp: 98 °F (36.7 °C)   TempSrc: Temporal   Weight: 117 kg (257 lb 3.2 oz)   Height: 165.1 cm (65\")   PainSc: 0-No pain     Current Status 11/21/2022   ECOG score 0      PHYSICAL EXAM:    GENERAL:  Well-developed, morbidly obese female BMI 42.8, in no acute distress.  Orientated to time place and the people.  SKIN:  Warm, dry without rashes, purpura or petechiae.  HEENT:  Normocephalic.  Wearing mask.   LYMPHATICS:  No cervical, supraclavicular adenopathy.  CHEST: Normal respiratory effort.  Lungs clear to auscultation. Good airflow.  CARDIAC:  Regular rate and rhythm without murmurs. Normal S1,S2.  ABDOMEN:  Soft, nontender with no organomegaly or masses.  Bowel sounds normal.  EXTREMITIES:  No clubbing, cyanosis or edema.  NEUROLOGICAL:  Grossly intact.  No focal neurological deficits.  PSYCHIATRIC:  Normal " affect and mood.        RECENT LABS:        WBC   Date Value Ref Range Status   11/21/2022 6.54 3.40 - 10.80 10*3/mm3 Final   08/22/2022 7.66 3.40 - 10.80 10*3/mm3 Final     Hemoglobin   Date Value Ref Range Status   11/21/2022 13.7 12.0 - 15.9 g/dL Final   08/22/2022 13.5 12.0 - 15.9 g/dL Final     Platelets   Date Value Ref Range Status   11/21/2022 472 (H) 140 - 450 10*3/mm3 Final   08/22/2022 441 140 - 450 10*3/mm3 Final     Comment:     New pt fingerstick tube-some plt clumping       Lab Results   Component Value Date    IRON 48 08/22/2022    TIBC 463 08/22/2022    FERRITIN 64.80 08/22/2022   Iron saturation 10%.    Lab Results   Component Value Date    [id number] 565 08/22/2022       Assessment & Plan       ASSESSMENT:  1. Mild thrombocytopenia, intermittent since 2017.   · She only had 1 time platelets 603,000 but the rest all below 600,000 and sometimes in the lower 400,000 range.   · 8/22/2022 patient had platelets 441,000.  I reviewed her peripheral blood smear 8/22/2022, no evidence for hematological malignancy. I think her thrombocytosis is most likely reactive to some inflammatory condition such as recently she had worsening GI symptoms sounds like viral infection. She had elevation of platelets.    · Over the past several years, there was no persistent worsening thrombocytosis, instead it was fluctuation over a time.  This does not fit with myeloproliferative disorder.  I do not think we need to do JAK2 mutation at this point.  · I will 11/21/2022 patient had worsening platelets 472,000.  We recommended to monitor.        2. Evidence of iron deficiency with low iron saturation in 01/2022. The patient also reports she started having heavy menses since 10/2021.   · Laboratory study 8/22/2022 reported normal hemoglobin, however she has microcytosis and morphology of peripheral blood smear also suspicious for some iron deficiency. I recommended to obtain iron study. I pointed out that iron deficiency  "itself could also sometimes cause elevation of platelets.   · Laboratory study indeed reports iron deficiency with iron saturation 10%, normal ferritin 64.8 mcg/dL on 8/22/2022. We asked patient to start oral iron twice a day.   · On 11/21/2022, patient reports tolerating oral iron except having smooth rashes. We do not know whether it is related to the oral iron.  She does have improved MCV value.  We are waiting for iron numbers today and to see whether we need to continue or not.    3.  The patient has elevated liver function panel 8/22/2022. This is not clear for the etiology. She reports had recent ultrasound of the liver and was reported normal. I do not have that report available. The patient will continue follow up with her primary care provider.  · Lab study today on 11/21/2022 reported normalized liver function panel.         PLAN:  1. Pending results for iron study today.  2. We will call her when the results come back to decide whether to continue oral iron or not.  3. We will bring patient back for reevaluation in 6 months. We will check CBC, ferritin, and iron profile.      ADDENDUM:   Lab Results   Component Value Date    IRON 53 11/21/2022    TIBC 389 11/21/2022    FERRITIN 91.30 11/21/2022   Iron saturation 14% on 11/21/2022.      Will hold oral iron treatment for now.     BOBBY MOORE M.D., Ph.D.          CC: LARA Mckenna          Transcribed from ambient dictation for Bobby Moore MD PhD by Jesus Carranza.  11/21/22   15:29 EST    [facility] Provider Statement:[id number]::\"Patient or patient representative verbalized consent to the visit recording.\",\"I have personally performed the services described in this document as transcribed by the above individual, and it is both accurate and complete.\"    "

## 2023-05-05 ENCOUNTER — OFFICE VISIT (OUTPATIENT)
Dept: ONCOLOGY | Facility: CLINIC | Age: 33
End: 2023-05-05
Payer: COMMERCIAL

## 2023-05-05 ENCOUNTER — LAB (OUTPATIENT)
Dept: LAB | Facility: HOSPITAL | Age: 33
End: 2023-05-05
Payer: COMMERCIAL

## 2023-05-05 VITALS
SYSTOLIC BLOOD PRESSURE: 116 MMHG | DIASTOLIC BLOOD PRESSURE: 77 MMHG | BODY MASS INDEX: 44.07 KG/M2 | HEIGHT: 65 IN | HEART RATE: 59 BPM | OXYGEN SATURATION: 100 % | TEMPERATURE: 98.6 F | WEIGHT: 264.5 LBS

## 2023-05-05 DIAGNOSIS — D75.839 THROMBOCYTOSIS: ICD-10-CM

## 2023-05-05 DIAGNOSIS — E61.1 IRON DEFICIENCY: Primary | ICD-10-CM

## 2023-05-05 DIAGNOSIS — E61.1 IRON DEFICIENCY: ICD-10-CM

## 2023-05-05 LAB
BASOPHILS # BLD AUTO: 0.06 10*3/MM3 (ref 0–0.2)
BASOPHILS NFR BLD AUTO: 1 % (ref 0–1.5)
DEPRECATED RDW RBC AUTO: 43.6 FL (ref 37–54)
EOSINOPHIL # BLD AUTO: 0.07 10*3/MM3 (ref 0–0.4)
EOSINOPHIL NFR BLD AUTO: 1.1 % (ref 0.3–6.2)
ERYTHROCYTE [DISTWIDTH] IN BLOOD BY AUTOMATED COUNT: 13.2 % (ref 12.3–15.4)
FERRITIN SERPL-MCNC: 49.7 NG/ML (ref 11–207)
HCT VFR BLD AUTO: 40.6 % (ref 34–46.6)
HGB BLD-MCNC: 12.9 G/DL (ref 12–15.9)
IMM GRANULOCYTES # BLD AUTO: 0.01 10*3/MM3 (ref 0–0.05)
IMM GRANULOCYTES NFR BLD AUTO: 0.2 % (ref 0–0.5)
IRON 24H UR-MRATE: 51 MCG/DL (ref 37–145)
IRON SATN MFR SERPL: 14 % (ref 14–48)
LYMPHOCYTES # BLD AUTO: 3.74 10*3/MM3 (ref 0.7–3.1)
LYMPHOCYTES NFR BLD AUTO: 61.4 % (ref 19.6–45.3)
MCH RBC QN AUTO: 28.5 PG (ref 26.6–33)
MCHC RBC AUTO-ENTMCNC: 31.8 G/DL (ref 31.5–35.7)
MCV RBC AUTO: 89.6 FL (ref 79–97)
MONOCYTES # BLD AUTO: 0.42 10*3/MM3 (ref 0.1–0.9)
MONOCYTES NFR BLD AUTO: 6.9 % (ref 5–12)
NEUTROPHILS NFR BLD AUTO: 1.79 10*3/MM3 (ref 1.7–7)
NEUTROPHILS NFR BLD AUTO: 29.4 % (ref 42.7–76)
NRBC BLD AUTO-RTO: 0 /100 WBC (ref 0–0.2)
PLATELET # BLD AUTO: 426 10*3/MM3 (ref 140–450)
PMV BLD AUTO: 8.3 FL (ref 6–12)
RBC # BLD AUTO: 4.53 10*6/MM3 (ref 3.77–5.28)
TIBC SERPL-MCNC: 374 MCG/DL (ref 249–505)
TRANSFERRIN SERPL-MCNC: 267 MG/DL (ref 200–360)
WBC NRBC COR # BLD: 6.09 10*3/MM3 (ref 3.4–10.8)

## 2023-05-05 PROCEDURE — 84466 ASSAY OF TRANSFERRIN: CPT

## 2023-05-05 PROCEDURE — 83540 ASSAY OF IRON: CPT

## 2023-05-05 PROCEDURE — 99213 OFFICE O/P EST LOW 20 MIN: CPT | Performed by: INTERNAL MEDICINE

## 2023-05-05 PROCEDURE — 82728 ASSAY OF FERRITIN: CPT

## 2023-05-05 PROCEDURE — 85025 COMPLETE CBC W/AUTO DIFF WBC: CPT

## 2023-05-05 PROCEDURE — 36415 COLL VENOUS BLD VENIPUNCTURE: CPT

## 2023-05-05 RX ORDER — PROPRANOLOL HYDROCHLORIDE 80 MG/1
1 TABLET ORAL EVERY 12 HOURS SCHEDULED
COMMUNITY
Start: 2023-04-14

## 2023-05-05 NOTE — PROGRESS NOTES
.     REASON FOR FOLLOWUP:     Mild thrombocytosis  Iron-deficiency anemia                                 HISTORY OF PRESENT ILLNESS:  The patient is a 32 y.o. year old female with seasonal allergy, hypertension and intermittent mild thrombocytosis who presents on 5/5/2023 for a 6-month follow-up evaluation.    Laboratory studies today 5/3/2023 reported that CBC showed normal platelets 426,000, hemoglobin 12.9, WBC 6,090 including neutrophils 1790, lymphocytes 3740.  Iron study reported ferritin 49.7, free iron 51 TIBC 374 iron saturation 14%.    She reports she is being tested for possible reaction to food and still in the process of being tested. There is also suspicion that she is allergic to the dye, which was added to the different medicine as coding material. Nevertheless, currently there is no one offer that test. The patient does report mild worsening fatigue.    Past Medical History:   Diagnosis Date    Asthma     History of thrombocytosis     Mild    Tattoos      No past surgical history on file.    HEMATOLOGY HISTORY:  The patient is a 31 y.o. year old female with seasonal allergy, hypertension and intermittent mild thrombocytosis who presented on 8/22/2022 for initial evaluation because of thrombocytosis.     Her most recent study from outside lab reported elevation of platelets 590,000 on 08/09/2022, in the meantime she has normal hemoglobin 13.4, WBC 6800. She also tested negative for hepatitis A IgM, hepatitis B surface antigen, hepatitis B core antibody IgM, and also negative for hepatitis C antibody. Her chemistry lab reported elevated , AST 71, and total bilirubin less than 0.2, normal alkaline phosphatase 69 and total serum protein 6.9, albumin 3.8.     The patient reports she was recently ill about a week ago, had nausea and vomiting but otherwise no significant medical problem. She reports no fever, sweating or chills. She already recovered to baseline condition.  She does not drink  alcohol.    I reviewed her other laboratory study results on 07/20/2022, she had normal folate 12.7 ng/mL, and there was no vitamin B12 level. Chemistry lab reported normal renal function creatinine 0.72, normal electrolytes, total protein 7.7, albumin 4.2, globulin 3.5, and mildly elevated AST 61, , but normal total bilirubin and alkaline phosphatase.     I reviewed previous lab results.  The oldest lab results available were from 10/10/2017 which reported elevated platelets 459,000 with reference range 150,000-379,000. She had total WBC 4000 including neutrophils 1700, lymphocytes 1900 monocytes 300, and hemoglobin 13.2, MCV 84.0. The next results available were from 10/12/2018. She had platelets 413,000 (with reference 150,000-379,000), WBC 5300, including neutrophils 1700, lymphocytes 3000, and hemoglobin 13.6, with MCV 86, MCHC 32.9. No chemistry lab.     The next lab results were from 11/01/2019 and she had normal TSH 2.27, platelets 453,000 with reference range 150,000-450,000, normal hemoglobin 13.3, MCV 85, MCHC 33.5. WBC was 4400, including neutrophils 2100, lymphocytes 1900. Chemistry lab reported unremarkable CMP with normal renal function, liver function panel, electrolytes, total protein 7.0, albumin 4.2, globulin 2.8.     The next lab was from 10/15/2021 which reported platelets 536,000 with reference range 150,000-450,000, hemoglobin 12.6, MCV 82, MCHC 33.0, and WBC 6300, including neutrophils 2900, lymphocytes 3000 and monocytes 400.     The next set of lab results were from 12/07/2021 with platelets 493,000, reference range 150,000-450,000, hemoglobin 13.0, MCV 83.0, MCHC 32.7 and WBC 5900 including neutrophils 3000, lymphocytes 2400.     Labs from 01/21/2022 reported normal homocysteine level 8.9, normal methylmalonic acid 226 nmol/L, normal CMP including creatinine 0.57, normal electrolytes, glucose and liver function panel except marginally elevated ALT 38 otherwise unremarkable liver  panel. She had iron saturation 15%, free iron 57, TIBC 386 and vitamin B12 level 234 pg/mL, C-reactive protein 27 mg/L without ferritin level.     Then study on 07/08/2022 reported ferritin 62 ng/mL but without iron profile, normal TSH 3.12, platelets 306,000 with normal range 150,000-450,000, and WBC 6600, neutrophils 3200, lymphocytes 3000, and hemoglobin 13.8, MCV 83.0, MCHC 32.5. Chemistry lab reported unremarkable CMP except mildly elevated AST 49, and worsening ALT 74 but normal alkaline phosphatase and total bilirubin.     The patient reports she had heavy menses since 10/2021. Prior to that she had regular menstrual volume.     The patient reports she is not on oral iron therapy.    The patient does report chronic fatigue but denies fainting, syncope. She denies melena or hematochezia. She has some night sweats, no cold sensitivity or heat intolerance. She has soreness on her tongue surface but otherwise denies mucositis. She has lower Gi symptoms with poor appetite, intermittent abdominal pain, nausea, constipation and diarrhea intermittently, however she denies melena or hematochezia or fresh red blood per rectum. She also has chronic back pain and arthralgia with some numbness in extremities. She also has intermittent headaches and dizziness but no fainting. She also reports easy bruising but no spontaneous bleeding such as epistaxis or gum bleeding.     Laboratory study today in our clinic on 08/22/2022 reported improved platelets 441,000 with reference range 150,000-450,000, normal WBC 7660 including neutrophils 3560, lymphocytes 3420, monocytes 510 and hemoglobin 13.5 with MCV 82.1, MCHC 33.5.     I reviewed her peripheral blood smear today, there is mild anisocytosis, however no target cells, no schistocytes.    Laboratory study 8/22/2022 reported improved  and AST 50, normal total bilirubin and alkaline phosphatase. She also has worsening iron saturation 10% with free iron 48, TIBC 463, and  mediocre ferritin 64.8 ng/mL, normal but mediocre B12 level at 565 pg/mL.     I think this patient would benefit from oral iron treatment with ferrous sulfate 325 mg twice a day. We will prescribe to the patient and inform her.    Laboratory results from 11/21/2022 reports hemoglobin 13.7 g/dL, WBC 6,540/mm3, platelets 472,000/mm3. Today, her iron is 53 mcg/dL, ferritin 91.30 ng/mL, iron saturation 14%, and TIBC 389 mcg/dL. Iron profile and ferritin results were pending during the visit.    Laboratory study in our clinic on 08/22/2022 reported improved platelets 441,000 with reference range 150,000-450,000, normal WBC 7660 including neutrophils 3560, lymphocytes 3420, monocytes 510 and hemoglobin 13.5 with MCV 82.1, MCHC 33.5.     Laboratory study on 8/22/2022 reported improved  and AST 50, normal total bilirubin and alkaline phosphatase. She also has worsening iron saturation 10% with free iron 48, TIBC 463, and mediocre ferritin 64.8 ng/mL, normal but mediocre B12 level at 565 pg/mL.     I think this patient would benefit from oral iron treatment with ferrous sulfate 325 mg twice a day. We will prescribe to the patient and inform her.    Laboratory study today on 11/21/2022 reported mild thrombocytosis platelets 472,000, normal hemoglobin 13.7, improved MCV 86.1, and WBC 6540 including neutrophils 2600, lymphocytes 3430.  Iron study reported improved ferritin 91.3 ng/mL, and iron saturation 14% with a free iron 53 TIBC 389.  Chemistry lab reported normalized liver function panel and unremarkable CMP.        MEDICATIONS    Current Outpatient Medications:     ferrous sulfate 325 (65 FE) MG tablet, Take 1 tablet by mouth 2 (Two) Times a Day With Meals. (Patient taking differently: Take 1 tablet by mouth 2 (Two) Times a Day With Meals. Patient is not taking at the moment, due to  instructions), Disp: 60 tablet, Rfl: 3    fexofenadine (ALLEGRA) 180 MG tablet, , Disp: , Rfl:     hydroCHLOROthiazide  "(HYDRODIURIL) 12.5 MG tablet, TAKE 1 OR 2 TABLETS BY MOUTH IN THE MORNING, Disp: , Rfl:     metroNIDAZOLE (METROGEL) 1 % gel, APPLY TOPICALLY TO AFFECTED AREA(S) TWO TIMES DAILY FOR 30 DAYS, Disp: , Rfl:     montelukast (SINGULAIR) 10 MG tablet, Take 1 tablet by mouth Daily., Disp: , Rfl:     propranolol (INDERAL) 80 MG tablet, Take 1 tablet by mouth Every 12 (Twelve) Hours., Disp: , Rfl:     ALLERGIES:     Allergies   Allergen Reactions    Clindamycin/Lincomycin Rash    Thimerosal Rash and Swelling    Bacitracin Rash    Blue Dyes (Parenteral) Rash    Neomycin Rash       SOCIAL HISTORY:       Social History     Socioeconomic History    Marital status: Single    Years of education: College   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   No history of cigarette smoking.  Very occasional social drinker.        FAMILY HISTORY:  No family history for blood disorder including sickle cell disorder.   Her mother has IBS.   Maternal great grandmother had malignancy no details, she also had diabetes.  She passed away.     .    REVIEW OF SYSTEMS:  See HPI.          Vitals:    05/05/23 1330   BP: 116/77   Pulse: 59   Temp: 98.6 °F (37 °C)   TempSrc: Temporal   SpO2: 100%   Weight: 120 kg (264 lb 8 oz)   Height: 165.1 cm (65\")   PainSc: 0-No pain         5/5/2023     1:28 PM   Current Status   ECOG score 0      PHYSICAL EXAM:    GENERAL:  Well-developed, well-nourished in no acute distress.    SKIN:  Warm, dry without rashes, purpura or petechiae.  HEENT:  Normocephalic.  Wearing mask.   LYMPHATICS:  No cervical, supraclavicular adenopathy.  CHEST: Normal respiratory effort.  Lungs clear to auscultation. Good airflow.  CARDIAC:  Regular rate and rhythm without murmurs. Normal S1,S2.  ABDOMEN:  Soft, nontender with no organomegaly or masses.  Bowel sounds normal.  EXTREMITIES:  No clubbing, cyanosis or edema.  NEUROLOGICAL:  Grossly intact.  No focal neurological deficits.  PSYCHIATRIC:  Normal affect and " mood.      RECENT LABS:        WBC   Date Value Ref Range Status   05/05/2023 6.09 3.40 - 10.80 10*3/mm3 Final   11/21/2022 6.54 3.40 - 10.80 10*3/mm3 Final   08/22/2022 7.66 3.40 - 10.80 10*3/mm3 Final     Hemoglobin   Date Value Ref Range Status   05/05/2023 12.9 12.0 - 15.9 g/dL Final   11/21/2022 13.7 12.0 - 15.9 g/dL Final   08/22/2022 13.5 12.0 - 15.9 g/dL Final     Platelets   Date Value Ref Range Status   05/05/2023 426 140 - 450 10*3/mm3 Final   11/21/2022 472 (H) 140 - 450 10*3/mm3 Final   08/22/2022 441 140 - 450 10*3/mm3 Final     Comment:     New pt fingerstick tube-some plt clumping       Lab Results   Component Value Date    [id number] 565 08/22/2022       Assessment & Plan       ASSESSMENT:  1. Mild thrombocytosis, intermittent since 2017.   She only had 1 time platelets 603,000 but the rest all below 600,000 and sometimes in the lower 400,000 range.   8/22/2022 patient had platelets 441,000.  I reviewed her peripheral blood smear 8/22/2022, no evidence for hematological malignancy. I think her thrombocytosis is most likely reactive to some inflammatory condition such as recently she had worsening GI symptoms sounds like viral infection. She had elevation of platelets.    Over the past several years, there was no persistent worsening thrombocytosis, instead it was fluctuation over time.  This does not fit with myeloproliferative disorder.  I do not think we need to do JAK2 mutation at this point.  On 11/21/2022 patient had worsening platelets 472,000.  We recommended to monitor.  Laboratory studies today on 05/05/2023 reported normal platelets 426,000. This is better compared to the number in 11/2022.          2. Evidence of iron deficiency with low iron saturation in 01/2022. The patient also reports she started having heavy menses since 10/2021.   Laboratory study 8/22/2022 reported normal hemoglobin, however she has microcytosis and morphology of peripheral blood smear also suspicious for some  "iron deficiency. I recommended to obtain iron study. I pointed out that iron deficiency itself could also sometimes cause elevation of platelets.   Laboratory study indeed reports iron deficiency with iron saturation 10%, normal ferritin 64.8 mcg/dL on 8/22/2022. We asked patient to start oral iron twice a day.   On 11/21/2022, patient reports tolerating oral iron except having smooth rashes. We do not know whether it is related to the oral iron.  She does have improved MCV value.  We are waiting for iron numbers today and to see whether we need to continue or not.  Laboratory study today on 05/05/2023 reported normal hemoglobin 12.7, and iron studies are pending. Patient reports that there was suspicion she was allergic to the iron pills which were coded with blue color. She is in the process to figure out whether that is true. The patient also is being tested for food allergy. Iron study is pending today.    3.  The patient has elevated liver function panel 8/22/2022. This is not clear for the etiology. She reports had recent ultrasound of the liver and was reported normal. I do not have that report available. The patient will continue follow up with her primary care provider.  Lab study today on 11/21/2022 reported normalized liver function panel.         PLAN:  Pending iron study results. We will call her when results back.  We will arrange patient to come back to for follow-up in 12 months. We will check CBC, CMP at that time. We will also check iron study.      ADDENDUM:   Lab Results   Component Value Date    IRON 51 05/05/2023    TIBC 374 05/05/2023    FERRITIN 49.70 05/05/2023   Iron saturation 14% on 11/21/2022.      Will hold oral iron treatment for now.       BOBBY MOORE M.D., Ph.D.          CC: LARA Mckenna          Transcribed from ambient dictation for Bobby Moore MD PhD by Cindy Lewis.      [facility] Provider Statement:[id number]::\"Patient or patient representative verbalized consent to the " "visit recording.\",\"I have personally performed the services described in this document as transcribed by the above individual, and it is both accurate and complete.\"    "

## 2024-06-06 DIAGNOSIS — D75.839 THROMBOCYTOSIS: ICD-10-CM

## 2024-06-06 DIAGNOSIS — E61.1 IRON DEFICIENCY: Primary | ICD-10-CM

## 2024-06-07 ENCOUNTER — OFFICE VISIT (OUTPATIENT)
Dept: ONCOLOGY | Facility: CLINIC | Age: 34
End: 2024-06-07
Payer: COMMERCIAL

## 2024-06-07 ENCOUNTER — LAB (OUTPATIENT)
Dept: LAB | Facility: HOSPITAL | Age: 34
End: 2024-06-07
Payer: COMMERCIAL

## 2024-06-07 VITALS
TEMPERATURE: 98.2 F | OXYGEN SATURATION: 100 % | SYSTOLIC BLOOD PRESSURE: 128 MMHG | HEIGHT: 65 IN | HEART RATE: 97 BPM | BODY MASS INDEX: 47.6 KG/M2 | DIASTOLIC BLOOD PRESSURE: 84 MMHG | RESPIRATION RATE: 16 BRPM | WEIGHT: 285.7 LBS

## 2024-06-07 DIAGNOSIS — E61.1 IRON DEFICIENCY: Primary | ICD-10-CM

## 2024-06-07 DIAGNOSIS — D75.839 THROMBOCYTOSIS: ICD-10-CM

## 2024-06-07 DIAGNOSIS — E61.1 IRON DEFICIENCY: ICD-10-CM

## 2024-06-07 LAB
ALBUMIN SERPL-MCNC: 3.6 G/DL (ref 3.5–5.2)
ALBUMIN/GLOB SERPL: 1.1 G/DL
ALP SERPL-CCNC: 50 U/L (ref 39–117)
ALT SERPL W P-5'-P-CCNC: 12 U/L (ref 1–33)
ANION GAP SERPL CALCULATED.3IONS-SCNC: 11 MMOL/L (ref 5–15)
AST SERPL-CCNC: 19 U/L (ref 1–32)
BASOPHILS # BLD AUTO: 0.03 10*3/MM3 (ref 0–0.2)
BASOPHILS NFR BLD AUTO: 0.5 % (ref 0–1.5)
BILIRUB SERPL-MCNC: 0.4 MG/DL (ref 0–1.2)
BUN SERPL-MCNC: 9 MG/DL (ref 6–20)
BUN/CREAT SERPL: 13.2 (ref 7–25)
CALCIUM SPEC-SCNC: 8.8 MG/DL (ref 8.6–10.5)
CHLORIDE SERPL-SCNC: 102 MMOL/L (ref 98–107)
CO2 SERPL-SCNC: 24 MMOL/L (ref 22–29)
CREAT SERPL-MCNC: 0.68 MG/DL (ref 0.57–1)
DEPRECATED RDW RBC AUTO: 43.2 FL (ref 37–54)
EGFRCR SERPLBLD CKD-EPI 2021: 118.1 ML/MIN/1.73
EOSINOPHIL # BLD AUTO: 0.1 10*3/MM3 (ref 0–0.4)
EOSINOPHIL NFR BLD AUTO: 1.7 % (ref 0.3–6.2)
ERYTHROCYTE [DISTWIDTH] IN BLOOD BY AUTOMATED COUNT: 13.2 % (ref 12.3–15.4)
FERRITIN SERPL-MCNC: 102 NG/ML (ref 13–150)
GLOBULIN UR ELPH-MCNC: 3.3 GM/DL
GLUCOSE SERPL-MCNC: 86 MG/DL (ref 65–99)
HCT VFR BLD AUTO: 40.4 % (ref 34–46.6)
HGB BLD-MCNC: 13 G/DL (ref 12–15.9)
IMM GRANULOCYTES # BLD AUTO: 0.01 10*3/MM3 (ref 0–0.05)
IMM GRANULOCYTES NFR BLD AUTO: 0.2 % (ref 0–0.5)
IRON 24H UR-MRATE: 61 MCG/DL (ref 37–145)
IRON SATN MFR SERPL: 18 % (ref 20–50)
LYMPHOCYTES # BLD AUTO: 2.69 10*3/MM3 (ref 0.7–3.1)
LYMPHOCYTES NFR BLD AUTO: 45.7 % (ref 19.6–45.3)
MCH RBC QN AUTO: 28.6 PG (ref 26.6–33)
MCHC RBC AUTO-ENTMCNC: 32.2 G/DL (ref 31.5–35.7)
MCV RBC AUTO: 89 FL (ref 79–97)
MONOCYTES # BLD AUTO: 0.33 10*3/MM3 (ref 0.1–0.9)
MONOCYTES NFR BLD AUTO: 5.6 % (ref 5–12)
NEUTROPHILS NFR BLD AUTO: 2.73 10*3/MM3 (ref 1.7–7)
NEUTROPHILS NFR BLD AUTO: 46.3 % (ref 42.7–76)
NRBC BLD AUTO-RTO: 0 /100 WBC (ref 0–0.2)
PLATELET # BLD AUTO: 437 10*3/MM3 (ref 140–450)
PMV BLD AUTO: 8.2 FL (ref 6–12)
POTASSIUM SERPL-SCNC: 3.8 MMOL/L (ref 3.5–5.2)
PROT SERPL-MCNC: 6.9 G/DL (ref 6–8.5)
RBC # BLD AUTO: 4.54 10*6/MM3 (ref 3.77–5.28)
SODIUM SERPL-SCNC: 137 MMOL/L (ref 136–145)
TIBC SERPL-MCNC: 334 MCG/DL (ref 298–536)
TRANSFERRIN SERPL-MCNC: 224 MG/DL (ref 200–360)
WBC NRBC COR # BLD AUTO: 5.89 10*3/MM3 (ref 3.4–10.8)

## 2024-06-07 PROCEDURE — 36415 COLL VENOUS BLD VENIPUNCTURE: CPT

## 2024-06-07 PROCEDURE — 83540 ASSAY OF IRON: CPT

## 2024-06-07 PROCEDURE — 82728 ASSAY OF FERRITIN: CPT

## 2024-06-07 PROCEDURE — 85025 COMPLETE CBC W/AUTO DIFF WBC: CPT

## 2024-06-07 PROCEDURE — 99213 OFFICE O/P EST LOW 20 MIN: CPT | Performed by: INTERNAL MEDICINE

## 2024-06-07 PROCEDURE — 80053 COMPREHEN METABOLIC PANEL: CPT

## 2024-06-07 PROCEDURE — 84466 ASSAY OF TRANSFERRIN: CPT

## 2024-06-07 RX ORDER — LANOLIN ALCOHOL/MO/W.PET/CERES
1 CREAM (GRAM) TOPICAL DAILY
COMMUNITY
Start: 2024-04-29

## 2024-06-07 RX ORDER — EPINEPHRINE 0.3 MG/.3ML
INJECTION SUBCUTANEOUS
COMMUNITY

## 2024-06-07 NOTE — PROGRESS NOTES
.     REASON FOR FOLLOWUP:     Mild thrombocytosis  Iron-deficiency anemia                                 HISTORY OF PRESENT ILLNESS:  The patient is a 33 y.o. year old female with seasonal allergy, hypertension and intermittent mild thrombocytosis who presents for annual follow-up evaluation.    History of Present Illness  The patient presents for evaluation of iron deficiency.    The patient, previously suspected to be allergic to oral iron pills, her primary care physician advised her to discontinue the use of oral iron pills. She has been self-administering over-the-counter [employer] iron supplements, which contains only a small quantity of iron, and she tolerated well, although she has ceased intake for several months. Recently, her primary care physician ordered additional laboratory tests due to the patient experiencing numbness, akin to pins-and-needles sensations in her arms and legs, fatigue, muscle weakness, and soreness. She is awaiting the results of these tests.     The patient also reported a rash while on oral iron pills. Over the past 2 to 3 months, she has experienced increased fatigue. She has an intrauterine device (IUD) in place, which has significantly improved her menstrual cycle, although she still experiences occasional heavy cycles.     She expressed curiosity about the duration of her iron supplementation. She is planning to have children.    Results  Laboratory Studies 6/7/2024  Hemoglobin is 13.0. White blood cell count is 5890. Neutrophils are 2730. Lymphocytes are 2690. Platelets are 437,000. Ferritin level is 102. Free iron is 61. Saturation is 18%. Liver panel is normal. Kidney function Cr. is 0.68. Electrolytes are normal including calcium 8.8.            Past Medical History:   Diagnosis Date    Asthma     History of thrombocytosis     Mild    Tattoos      No past surgical history on file.    HEMATOLOGY HISTORY:  The patient is a 31 y.o. year old female with seasonal  allergy, hypertension and intermittent mild thrombocytosis who presented on 8/22/2022 for initial evaluation because of thrombocytosis.     Her most recent study from outside lab reported elevation of platelets 590,000 on 08/09/2022, in the meantime she has normal hemoglobin 13.4, WBC 6800. She also tested negative for hepatitis A IgM, hepatitis B surface antigen, hepatitis B core antibody IgM, and also negative for hepatitis C antibody. Her chemistry lab reported elevated , AST 71, and total bilirubin less than 0.2, normal alkaline phosphatase 69 and total serum protein 6.9, albumin 3.8.     The patient reports she was recently ill about a week ago, had nausea and vomiting but otherwise no significant medical problem. She reports no fever, sweating or chills. She already recovered to baseline condition.  She does not drink alcohol.    I reviewed her other laboratory study results on 07/20/2022, she had normal folate 12.7 ng/mL, and there was no vitamin B12 level. Chemistry lab reported normal renal function creatinine 0.72, normal electrolytes, total protein 7.7, albumin 4.2, globulin 3.5, and mildly elevated AST 61, , but normal total bilirubin and alkaline phosphatase.     I reviewed previous lab results.  The oldest lab results available were from 10/10/2017 which reported elevated platelets 459,000 with reference range 150,000-379,000. She had total WBC 4000 including neutrophils 1700, lymphocytes 1900 monocytes 300, and hemoglobin 13.2, MCV 84.0. The next results available were from 10/12/2018. She had platelets 413,000 (with reference 150,000-379,000), WBC 5300, including neutrophils 1700, lymphocytes 3000, and hemoglobin 13.6, with MCV 86, MCHC 32.9. No chemistry lab.     The next lab results were from 11/01/2019 and she had normal TSH 2.27, platelets 453,000 with reference range 150,000-450,000, normal hemoglobin 13.3, MCV 85, MCHC 33.5. WBC was 4400, including neutrophils 2100, lymphocytes  1900. Chemistry lab reported unremarkable CMP with normal renal function, liver function panel, electrolytes, total protein 7.0, albumin 4.2, globulin 2.8.     The next lab was from 10/15/2021 which reported platelets 536,000 with reference range 150,000-450,000, hemoglobin 12.6, MCV 82, MCHC 33.0, and WBC 6300, including neutrophils 2900, lymphocytes 3000 and monocytes 400.     The next set of lab results were from 12/07/2021 with platelets 493,000, reference range 150,000-450,000, hemoglobin 13.0, MCV 83.0, MCHC 32.7 and WBC 5900 including neutrophils 3000, lymphocytes 2400.     Labs from 01/21/2022 reported normal homocysteine level 8.9, normal methylmalonic acid 226 nmol/L, normal CMP including creatinine 0.57, normal electrolytes, glucose and liver function panel except marginally elevated ALT 38 otherwise unremarkable liver panel. She had iron saturation 15%, free iron 57, TIBC 386 and vitamin B12 level 234 pg/mL, C-reactive protein 27 mg/L without ferritin level.     Then study on 07/08/2022 reported ferritin 62 ng/mL but without iron profile, normal TSH 3.12, platelets 306,000 with normal range 150,000-450,000, and WBC 6600, neutrophils 3200, lymphocytes 3000, and hemoglobin 13.8, MCV 83.0, MCHC 32.5. Chemistry lab reported unremarkable CMP except mildly elevated AST 49, and worsening ALT 74 but normal alkaline phosphatase and total bilirubin.     The patient reports she had heavy menses since 10/2021. Prior to that she had regular menstrual volume.     The patient reports she is not on oral iron therapy.    The patient does report chronic fatigue but denies fainting, syncope. She denies melena or hematochezia. She has some night sweats, no cold sensitivity or heat intolerance. She has soreness on her tongue surface but otherwise denies mucositis. She has lower Gi symptoms with poor appetite, intermittent abdominal pain, nausea, constipation and diarrhea intermittently, however she denies melena or  hematochezia or fresh red blood per rectum. She also has chronic back pain and arthralgia with some numbness in extremities. She also has intermittent headaches and dizziness but no fainting. She also reports easy bruising but no spontaneous bleeding such as epistaxis or gum bleeding.     Laboratory study today in our clinic on 08/22/2022 reported improved platelets 441,000 with reference range 150,000-450,000, normal WBC 7660 including neutrophils 3560, lymphocytes 3420, monocytes 510 and hemoglobin 13.5 with MCV 82.1, MCHC 33.5.     I reviewed her peripheral blood smear today, there is mild anisocytosis, however no target cells, no schistocytes.    Laboratory study 8/22/2022 reported improved  and AST 50, normal total bilirubin and alkaline phosphatase. She also has worsening iron saturation 10% with free iron 48, TIBC 463, and mediocre ferritin 64.8 ng/mL, normal but mediocre B12 level at 565 pg/mL.     I think this patient would benefit from oral iron treatment with ferrous sulfate 325 mg twice a day. We will prescribe to the patient and inform her.    Laboratory results from 11/21/2022 reports hemoglobin 13.7 g/dL, WBC 6,540/mm3, platelets 472,000/mm3. Today, her iron is 53 mcg/dL, ferritin 91.30 ng/mL, iron saturation 14%, and TIBC 389 mcg/dL. Iron profile and ferritin results were pending during the visit.    Laboratory study in our clinic on 08/22/2022 reported improved platelets 441,000 with reference range 150,000-450,000, normal WBC 7660 including neutrophils 3560, lymphocytes 3420, monocytes 510 and hemoglobin 13.5 with MCV 82.1, MCHC 33.5.     Laboratory study on 8/22/2022 reported improved  and AST 50, normal total bilirubin and alkaline phosphatase. She also has worsening iron saturation 10% with free iron 48, TIBC 463, and mediocre ferritin 64.8 ng/mL, normal but mediocre B12 level at 565 pg/mL.     I think this patient would benefit from oral iron treatment with ferrous sulfate 325 mg  twice a day. We will prescribe to the patient and inform her.    Laboratory study today on 11/21/2022 reported mild thrombocytosis platelets 472,000, normal hemoglobin 13.7, improved MCV 86.1, and WBC 6540 including neutrophils 2600, lymphocytes 3430.  Iron study reported improved ferritin 91.3 ng/mL, and iron saturation 14% with a free iron 53 TIBC 389.  Chemistry lab reported normalized liver function panel and unremarkable CMP.    Laboratory studies 5/3/2023 reported that CBC showed normal platelets 426,000, hemoglobin 12.9, WBC 6,090 including neutrophils 1790, lymphocytes 3740.  Iron study reported ferritin 49.7, free iron 51 TIBC 374 iron saturation 14%.  6/26/2026.    MEDICATIONS    Current Outpatient Medications:     EPINEPHrine (EPIPEN) 0.3 MG/0.3ML solution auto-injector injection, as directed Injection for 90 days, Disp: , Rfl:     fexofenadine (ALLEGRA) 180 MG tablet, , Disp: , Rfl:     hydroCHLOROthiazide (HYDRODIURIL) 12.5 MG tablet, TAKE 1 OR 2 TABLETS BY MOUTH IN THE MORNING, Disp: , Rfl:     Magnesium Oxide -Mg Supplement 400 (240 Mg) MG tablet, Take 1 tablet by mouth Daily., Disp: , Rfl:     montelukast (SINGULAIR) 10 MG tablet, Take 1 tablet by mouth Daily., Disp: , Rfl:     ALLERGIES:     Allergies   Allergen Reactions    Clindamycin/Lincomycin Rash    Thimerosal (Thiomersal) Rash and Swelling    Bacitracin Rash    Blue Dyes (Parenteral) Rash    Neomycin Rash       SOCIAL HISTORY:       Social History     Socioeconomic History    Marital status: Single    Years of education: College   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   No history of cigarette smoking.  Very occasional social drinker.        FAMILY HISTORY:  No family history for blood disorder including sickle cell disorder.   Her mother has IBS.   Maternal great grandmother had malignancy no details, she also had diabetes.  She passed away.     .    REVIEW OF SYSTEMS:  See HPI.          Vitals:    06/07/24 1343  "  BP: 128/84   Pulse: 97   Resp: 16   Temp: 98.2 °F (36.8 °C)   TempSrc: Oral   SpO2: 100%   Weight: 130 kg (285 lb 11.2 oz)   Height: 165.1 cm (65\")   PainSc: 0-No pain         6/7/2024     1:39 PM   Current Status   ECOG score 0     Physical Exam    GENERAL:  Well-developed, well-nourished in no acute distress.   SKIN:  Warm, dry without rashes, purpura or petechiae.  HEENT:  Normocephalic.   LYMPHATICS:  No cervical, supraclavicular adenopathy.  CHEST: Normal respiratory effort.  Lungs clear to auscultation. Good airflow.  CARDIAC:  Regular rate and rhythm. Normal S1,S2.  ABDOMEN:  Soft, no tender.  Bowel sounds normal.  EXTREMITIES:  No lower extremity edema.    RECENT LABS:  Lab Results   Component Value Date    WBC 5.89 06/07/2024    HGB 13.0 06/07/2024    HCT 40.4 06/07/2024    MCV 89.0 06/07/2024     06/07/2024     Lab Results   Component Value Date    NEUTROABS 2.73 06/07/2024     Lab Results   Component Value Date    IRON 61 06/07/2024    TIBC 334 06/07/2024    FERRITIN 102.00 06/07/2024   Iron saturation 18% 6/7/2024.       Lab Results   Component Value Date    [id number] 565 08/22/2022       Assessment & Plan       Assessment & Plan    ASSESSMENT:  1. Mild thrombocytosis, intermittent since 2017.   She only had 1 time platelets 603,000 but the rest all below 600,000 and sometimes in the lower 400,000 range.   8/22/2022 patient had platelets 441,000.  I reviewed her peripheral blood smear 8/22/2022, no evidence for hematological malignancy. I think her thrombocytosis is most likely reactive to some inflammatory condition such as recently she had worsening GI symptoms sounds like viral infection. She had elevation of platelets.    Over the past several years, there was no persistent worsening thrombocytosis, instead it was fluctuation over time.  This does not fit with myeloproliferative disorder.  I do not think we need to do JAK2 mutation at this point.  On 11/21/2022 patient had worsening " platelets 472,000.  We recommended to monitor.  Laboratory studies today on 05/05/2023 reported normal platelets 426,000. This is better compared to the number in 11/2022.     6/7/2024 patient has normal platelets 437,000.       2. Evidence of iron deficiency with low iron saturation in 01/2022. The patient also reports she started having heavy menses since 10/2021.   Laboratory study 8/22/2022 reported normal hemoglobin, however she has microcytosis and morphology of peripheral blood smear also suspicious for some iron deficiency. I recommended to obtain iron study. I pointed out that iron deficiency itself could also sometimes cause elevation of platelets.   Laboratory study indeed reports iron deficiency with iron saturation 10%, normal ferritin 64.8 mcg/dL on 8/22/2022. We asked patient to start oral iron twice a day.   On 11/21/2022, patient reports tolerating oral iron except having smooth rashes. We do not know whether it is related to the oral iron.  She does have improved MCV value.  We are waiting for iron numbers today and to see whether we need to continue or not.  Laboratory study on 05/05/2023 reported normal hemoglobin 12.7, and iron studies are pending. Patient reports that there was suspicion she was allergic to the iron pills which were coded with blue color. She is in the process to figure out whether that is true. The patient also is being tested for food allergy. Iron study reported ferritin 49.7 and iron saturation 14% with free iron 51 today.  We instructed patient to stop taking oral iron for now.    Today 6/7/2024 the patient's laboratory results have shown normal hemoglobin 13.0, significant improvement, with ferritin level 100.2, iron saturation 18%, free iron 61, TIBC 334. The chemistry lab reported normal CMP. I have advised the patient to continue with the oral iron regimen, administered twice daily, to maintain the iron supply. If she continues to experience ongoing menses, she is also  planning to have pregnancy, which should considerably consume iron over time. Should the patient become pregnant and experience intolerance to the oral iron such as nausea, constipation etc., we will necessitate a course of intravenous iron treatment. The patient has expressed understanding of this plan.      3.  The patient has elevated liver function panel 8/22/2022. This is not clear for the etiology. She reports had recent ultrasound of the liver and was reported normal. I do not have that report available. The patient will continue follow up with her primary care provider.  Lab study on 11/21/2022 reported normalized liver function panel.   6/7/2024 patient has completed normal liver function panel.        PLAN:  Continue oral iron twice a day.  I will see patient in 12 months, repeating laboratory studies CBC ferritin iron profile and CMP for reassessment.    I discussed with the patient about laboratory results and further management plan.  Patient voiced understanding and agreeable.          Enrique Pantoja MD PhD       CC: LARA Mckenna

## 2025-06-03 DIAGNOSIS — E61.1 IRON DEFICIENCY: Primary | ICD-10-CM

## 2025-06-03 DIAGNOSIS — D75.839 THROMBOCYTOSIS: ICD-10-CM

## 2025-07-28 ENCOUNTER — LAB (OUTPATIENT)
Dept: LAB | Facility: HOSPITAL | Age: 35
End: 2025-07-28
Payer: COMMERCIAL

## 2025-07-28 ENCOUNTER — OFFICE VISIT (OUTPATIENT)
Dept: ONCOLOGY | Facility: CLINIC | Age: 35
End: 2025-07-28
Payer: COMMERCIAL

## 2025-07-28 VITALS
WEIGHT: 293 LBS | TEMPERATURE: 98.6 F | SYSTOLIC BLOOD PRESSURE: 120 MMHG | DIASTOLIC BLOOD PRESSURE: 88 MMHG | BODY MASS INDEX: 48.82 KG/M2 | OXYGEN SATURATION: 100 % | RESPIRATION RATE: 16 BRPM | HEART RATE: 112 BPM | HEIGHT: 65 IN

## 2025-07-28 DIAGNOSIS — E61.1 IRON DEFICIENCY: ICD-10-CM

## 2025-07-28 DIAGNOSIS — E87.6 HYPOKALEMIA: Primary | ICD-10-CM

## 2025-07-28 DIAGNOSIS — D75.839 THROMBOCYTOSIS: ICD-10-CM

## 2025-07-28 LAB
ALBUMIN SERPL-MCNC: 4.3 G/DL (ref 3.5–5.2)
ALBUMIN/GLOB SERPL: 1.2 G/DL
ALP SERPL-CCNC: 60 U/L (ref 39–117)
ALT SERPL W P-5'-P-CCNC: 33 U/L (ref 1–33)
ANION GAP SERPL CALCULATED.3IONS-SCNC: 13.5 MMOL/L (ref 5–15)
AST SERPL-CCNC: 23 U/L (ref 1–32)
BASOPHILS # BLD AUTO: 0.05 10*3/MM3 (ref 0–0.2)
BASOPHILS NFR BLD AUTO: 0.5 % (ref 0–1.5)
BILIRUB SERPL-MCNC: 0.3 MG/DL (ref 0–1.2)
BUN SERPL-MCNC: 10.3 MG/DL (ref 6–20)
BUN/CREAT SERPL: 16.1 (ref 7–25)
CALCIUM SPEC-SCNC: 10 MG/DL (ref 8.6–10.5)
CHLORIDE SERPL-SCNC: 99 MMOL/L (ref 98–107)
CO2 SERPL-SCNC: 25.5 MMOL/L (ref 22–29)
CREAT SERPL-MCNC: 0.64 MG/DL (ref 0.57–1)
DEPRECATED RDW RBC AUTO: 41.8 FL (ref 37–54)
EGFRCR SERPLBLD CKD-EPI 2021: 119.1 ML/MIN/1.73
EOSINOPHIL # BLD AUTO: 0.03 10*3/MM3 (ref 0–0.4)
EOSINOPHIL NFR BLD AUTO: 0.3 % (ref 0.3–6.2)
ERYTHROCYTE [DISTWIDTH] IN BLOOD BY AUTOMATED COUNT: 13.4 % (ref 12.3–15.4)
FERRITIN SERPL-MCNC: 193 NG/ML (ref 13–150)
GLOBULIN UR ELPH-MCNC: 3.6 GM/DL
GLUCOSE SERPL-MCNC: 83 MG/DL (ref 65–99)
HCT VFR BLD AUTO: 42.6 % (ref 34–46.6)
HGB BLD-MCNC: 13.4 G/DL (ref 12–15.9)
IMM GRANULOCYTES # BLD AUTO: 0.03 10*3/MM3 (ref 0–0.05)
IMM GRANULOCYTES NFR BLD AUTO: 0.3 % (ref 0–0.5)
IRON 24H UR-MRATE: 34 MCG/DL (ref 37–145)
IRON SATN MFR SERPL: 10 % (ref 20–50)
LYMPHOCYTES # BLD AUTO: 3.37 10*3/MM3 (ref 0.7–3.1)
LYMPHOCYTES NFR BLD AUTO: 35.2 % (ref 19.6–45.3)
MCH RBC QN AUTO: 26.9 PG (ref 26.6–33)
MCHC RBC AUTO-ENTMCNC: 31.5 G/DL (ref 31.5–35.7)
MCV RBC AUTO: 85.5 FL (ref 79–97)
MONOCYTES # BLD AUTO: 0.6 10*3/MM3 (ref 0.1–0.9)
MONOCYTES NFR BLD AUTO: 6.3 % (ref 5–12)
NEUTROPHILS NFR BLD AUTO: 5.49 10*3/MM3 (ref 1.7–7)
NEUTROPHILS NFR BLD AUTO: 57.4 % (ref 42.7–76)
NRBC BLD AUTO-RTO: 0 /100 WBC (ref 0–0.2)
PLATELET # BLD AUTO: 520 10*3/MM3 (ref 140–450)
PMV BLD AUTO: 8.1 FL (ref 6–12)
POTASSIUM SERPL-SCNC: 3.1 MMOL/L (ref 3.5–5.2)
PROT SERPL-MCNC: 7.9 G/DL (ref 6–8.5)
RBC # BLD AUTO: 4.98 10*6/MM3 (ref 3.77–5.28)
SODIUM SERPL-SCNC: 138 MMOL/L (ref 136–145)
TIBC SERPL-MCNC: 329 MCG/DL (ref 298–536)
TRANSFERRIN SERPL-MCNC: 221 MG/DL (ref 200–360)
WBC NRBC COR # BLD AUTO: 9.57 10*3/MM3 (ref 3.4–10.8)

## 2025-07-28 PROCEDURE — 80053 COMPREHEN METABOLIC PANEL: CPT

## 2025-07-28 PROCEDURE — 85025 COMPLETE CBC W/AUTO DIFF WBC: CPT

## 2025-07-28 PROCEDURE — 82728 ASSAY OF FERRITIN: CPT

## 2025-07-28 PROCEDURE — 83540 ASSAY OF IRON: CPT

## 2025-07-28 PROCEDURE — 84466 ASSAY OF TRANSFERRIN: CPT

## 2025-07-28 PROCEDURE — 36415 COLL VENOUS BLD VENIPUNCTURE: CPT

## 2025-07-28 RX ORDER — NALTREXONE HYDROCHLORIDE AND BUPROPION HYDROCHLORIDE 8; 90 MG/1; MG/1
1 TABLET, EXTENDED RELEASE ORAL 2 TIMES DAILY
COMMUNITY
Start: 2025-07-15

## 2025-07-28 RX ORDER — POTASSIUM CHLORIDE 1500 MG/1
20 TABLET, EXTENDED RELEASE ORAL DAILY
Qty: 30 TABLET | Refills: 0 | Status: SHIPPED | OUTPATIENT
Start: 2025-07-28

## 2025-07-28 RX ORDER — FERROUS SULFATE 325(65) MG
325 TABLET ORAL
COMMUNITY

## 2025-08-11 ENCOUNTER — TELEPHONE (OUTPATIENT)
Dept: ONCOLOGY | Facility: CLINIC | Age: 35
End: 2025-08-11
Payer: COMMERCIAL